# Patient Record
Sex: FEMALE | Race: WHITE | NOT HISPANIC OR LATINO | URBAN - METROPOLITAN AREA
[De-identification: names, ages, dates, MRNs, and addresses within clinical notes are randomized per-mention and may not be internally consistent; named-entity substitution may affect disease eponyms.]

---

## 2018-05-29 ENCOUNTER — OUTPATIENT (OUTPATIENT)
Dept: OUTPATIENT SERVICES | Facility: HOSPITAL | Age: 48
LOS: 1 days | End: 2018-05-29

## 2018-05-29 VITALS
HEIGHT: 61 IN | RESPIRATION RATE: 16 BRPM | HEART RATE: 55 BPM | SYSTOLIC BLOOD PRESSURE: 120 MMHG | DIASTOLIC BLOOD PRESSURE: 80 MMHG | WEIGHT: 100.09 LBS | TEMPERATURE: 97 F

## 2018-05-29 DIAGNOSIS — Z98.82 BREAST IMPLANT STATUS: Chronic | ICD-10-CM

## 2018-05-29 DIAGNOSIS — Z41.1 ENCOUNTER FOR COSMETIC SURGERY: ICD-10-CM

## 2018-05-29 DIAGNOSIS — Z98.51 TUBAL LIGATION STATUS: Chronic | ICD-10-CM

## 2018-05-29 RX ORDER — SODIUM CHLORIDE 9 MG/ML
1000 INJECTION, SOLUTION INTRAVENOUS
Qty: 0 | Refills: 0 | Status: DISCONTINUED | OUTPATIENT
Start: 2018-06-05 | End: 2018-06-05

## 2018-05-29 NOTE — H&P PST ADULT - NEGATIVE NEUROLOGICAL SYMPTOMS
no difficulty walking/no loss of sensation/no paresthesias/no tremors/no vertigo/no weakness/no generalized seizures/no transient paralysis

## 2018-05-29 NOTE — H&P PST ADULT - LYMPHATIC
posterior cervical R/anterior cervical L/supraclavicular L/posterior cervical L/anterior cervical R/supraclavicular R

## 2018-05-29 NOTE — H&P PST ADULT - NEGATIVE MUSCULOSKELETAL SYMPTOMS
no joint swelling/no arthralgia/no arm pain L/no arm pain R/no muscle weakness/no neck pain/no leg pain R/no myalgia/no stiffness/no muscle cramps/no back pain/no leg pain L

## 2018-05-29 NOTE — H&P PST ADULT - MUSCULOSKELETAL
details… detailed exam no joint warmth/ROM intact/no joint swelling/normal strength/no joint erythema/no calf tenderness

## 2018-05-29 NOTE — H&P PST ADULT - HISTORY OF PRESENT ILLNESS
47 yo female with PMH hyperlipidemia presents to pre surgical testing.  Pt reports she has had multiple breast augmentations and is not happy with the appearance of her breasts.  Pt is scheduled for bilateral breast implant exchange on 6/5/18.

## 2018-05-29 NOTE — H&P PST ADULT - PROBLEM SELECTOR PLAN 1
Pt is scheduled for bilateral breast implant exchange on 6/5/18.  Pre op instructions including chlorhexidine wash given and pt able to verbalize understanding.

## 2018-06-05 ENCOUNTER — OUTPATIENT (OUTPATIENT)
Dept: OUTPATIENT SERVICES | Facility: HOSPITAL | Age: 48
LOS: 1 days | Discharge: HOME | End: 2018-06-05
Payer: SELF-PAY

## 2018-06-05 VITALS
HEART RATE: 87 BPM | OXYGEN SATURATION: 100 % | SYSTOLIC BLOOD PRESSURE: 105 MMHG | RESPIRATION RATE: 16 BRPM | DIASTOLIC BLOOD PRESSURE: 72 MMHG | TEMPERATURE: 98 F

## 2018-06-05 VITALS
HEART RATE: 64 BPM | RESPIRATION RATE: 16 BRPM | DIASTOLIC BLOOD PRESSURE: 70 MMHG | SYSTOLIC BLOOD PRESSURE: 129 MMHG | WEIGHT: 100.09 LBS | TEMPERATURE: 98 F | OXYGEN SATURATION: 99 % | HEIGHT: 61 IN

## 2018-06-05 DIAGNOSIS — Z98.82 BREAST IMPLANT STATUS: Chronic | ICD-10-CM

## 2018-06-05 DIAGNOSIS — Z98.51 TUBAL LIGATION STATUS: Chronic | ICD-10-CM

## 2018-06-05 DIAGNOSIS — Z41.1 ENCOUNTER FOR COSMETIC SURGERY: ICD-10-CM

## 2018-06-05 PROCEDURE — 19328 RMVL INTACT BREAST IMPLANT: CPT | Mod: 50

## 2018-06-05 PROCEDURE — 19325 BREAST AUGMENTATION W/IMPLT: CPT | Mod: 50

## 2018-06-05 PROCEDURE — 19316 MASTOPEXY: CPT | Mod: 50

## 2018-06-05 RX ORDER — SODIUM CHLORIDE 9 MG/ML
1000 INJECTION INTRAMUSCULAR; INTRAVENOUS; SUBCUTANEOUS
Qty: 0 | Refills: 0 | Status: DISCONTINUED | OUTPATIENT
Start: 2018-06-05 | End: 2018-06-20

## 2018-06-05 RX ORDER — CHOLECALCIFEROL (VITAMIN D3) 125 MCG
1 CAPSULE ORAL
Qty: 0 | Refills: 0 | COMMUNITY

## 2018-06-05 RX ORDER — FENTANYL CITRATE 50 UG/ML
25 INJECTION INTRAVENOUS
Qty: 0 | Refills: 0 | Status: DISCONTINUED | OUTPATIENT
Start: 2018-06-05 | End: 2018-06-05

## 2018-06-05 RX ORDER — ONDANSETRON 8 MG/1
4 TABLET, FILM COATED ORAL ONCE
Qty: 0 | Refills: 0 | Status: COMPLETED | OUTPATIENT
Start: 2018-06-05 | End: 2018-06-05

## 2018-06-05 RX ORDER — OXYCODONE HYDROCHLORIDE 5 MG/1
5 TABLET ORAL ONCE
Qty: 0 | Refills: 0 | Status: DISCONTINUED | OUTPATIENT
Start: 2018-06-05 | End: 2018-06-05

## 2018-06-05 RX ORDER — SODIUM CHLORIDE 9 MG/ML
0 INJECTION INTRAMUSCULAR; INTRAVENOUS; SUBCUTANEOUS
Qty: 0 | Refills: 0 | COMMUNITY
Start: 2018-06-05

## 2018-06-05 RX ORDER — SODIUM CHLORIDE 9 MG/ML
500 INJECTION, SOLUTION INTRAVENOUS ONCE
Qty: 0 | Refills: 0 | Status: COMPLETED | OUTPATIENT
Start: 2018-06-05 | End: 2018-06-05

## 2018-06-05 RX ADMIN — ONDANSETRON 4 MILLIGRAM(S): 8 TABLET, FILM COATED ORAL at 15:50

## 2018-06-05 RX ADMIN — SODIUM CHLORIDE 1000 MILLILITER(S): 9 INJECTION, SOLUTION INTRAVENOUS at 16:40

## 2018-06-05 RX ADMIN — Medication 200 MILLIGRAM(S): at 16:40

## 2018-06-05 NOTE — ASU PATIENT PROFILE, ADULT - VISION (WITH CORRECTIVE LENSES IF THE PATIENT USUALLY WEARS THEM):
Normal vision: sees adequately in most situations; can see medication labels, newsprint near sighted/Partially impaired: cannot see medication labels or newsprint, but can see obstacles in path, and the surrounding layout; can count fingers at arm's length

## 2018-06-05 NOTE — ASU DISCHARGE PLAN (ADULT/PEDIATRIC). - MEDICATION SUMMARY - MEDICATIONS TO TAKE
I will START or STAY ON the medications listed below when I get home from the hospital:    Vitamin D3 1000 intl units oral tablet  -- 1 tab(s) by mouth once a day  -- Indication: For home med    biotin 1000 mcg oral tablet  -- 1 tab(s) by mouth once a day  -- Indication: For home med

## 2018-06-05 NOTE — ASU DISCHARGE PLAN (ADULT/PEDIATRIC). - NOTIFY
Bleeding that does not stop/Swelling that continues/Pain not relieved by Medications Bleeding that does not stop/Unable to Urinate/Pain not relieved by Medications/Persistent Nausea and Vomiting/Swelling that continues/Inability to Tolerate Liquids or Foods/Fever greater than 101

## 2018-06-05 NOTE — ASU DISCHARGE PLAN (ADULT/PEDIATRIC). - CONDITIONS AT DISCHARGE
stable stable/ Patient is stable and meets discharge criteria. Patient made aware that he/she must wait on unit to be escorted by ASU RN or ASU PCA to awaiting car in front of the main building after being discharged by Anesthesia Department.

## 2018-06-05 NOTE — BRIEF OPERATIVE NOTE - PROCEDURE
<<-----Click on this checkbox to enter Procedure Breast implant revision  06/05/2018    Active  [id number]

## 2023-05-25 NOTE — H&P PST ADULT - TOBACCO USE
Impression: Cataracts, OU. Status: Symptomatic. Plan: Mild.  Followed by Dr. Jannie Bose Former smoker

## 2024-08-28 NOTE — ASU PREOP CHECKLIST - WEIGHT IN KG
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 anastomosis N/A 02/20/2002    tubular adenoma   • Stress test  02/25/2013     No family history on file.   Social History     Tobacco Use   • Smoking status: Every Day     Current packs/day: 0.50     Average packs/day: 1.4 packs/day for 64.7 years (91.3 ttl pk-yrs)     Types: Cigarettes     Start date: 1960   • Smokeless tobacco: Never   • Tobacco comments:     5-6 cig a day    Substance Use Topics   • Alcohol use: Yes     Comment: occaisionally     On 8/28/2024, Aida BOWEN scribed the services personally performed by Ruiz Montez DO      The documentation recorded by the scribe accurately and completely reflects the service(s) I personally performed and the decisions made by me.           I spent a total of 30 minutes on the day of the visit.   This included preparing to see the patient (review of labs, diagnostic testings and notes), obtaining a history, performing a medically appropriate examination, counseling and educating the patient regarding diagnosis and plan of care, ordering medications, tests or procedures, communicating the care plan with other healthcare professionals and documenting information in the electronic health record.       RUIZ MONTEZ DO, [facility]  CARDIOVASCULAR MEDICINE     08/31/24  8:24 PM       Per federal law for medical records transparency, this note can be viewed by the patient. However, this note is  structured and documented as a communication amongst medical professionals/clinicians. Verbiage and commentary are not performed for communication directly with a nonmedical person including, generally, the patient.    Additionally, generally, at least a portion of this note was generated using dictation software, this may result in alternative word use, sentence fragments, dropped words, or misinterpreted phrases, syntax, or words. Best effort has been made to correct such errors. However, if there is confusion related to these issues, do not hesitate to contact our office.